# Patient Record
Sex: FEMALE | Race: WHITE
[De-identification: names, ages, dates, MRNs, and addresses within clinical notes are randomized per-mention and may not be internally consistent; named-entity substitution may affect disease eponyms.]

---

## 2018-04-11 ENCOUNTER — HOSPITAL ENCOUNTER (OUTPATIENT)
Dept: HOSPITAL 75 - SDC | Age: 58
End: 2018-04-11
Attending: PODIATRIST
Payer: COMMERCIAL

## 2018-04-11 VITALS — BODY MASS INDEX: 49.61 KG/M2 | HEIGHT: 63 IN | WEIGHT: 280 LBS

## 2018-04-11 VITALS — DIASTOLIC BLOOD PRESSURE: 67 MMHG | SYSTOLIC BLOOD PRESSURE: 162 MMHG

## 2018-04-11 DIAGNOSIS — Z45.2: Primary | ICD-10-CM

## 2018-04-11 PROCEDURE — 36569 INSJ PICC 5 YR+ W/O IMAGING: CPT

## 2018-04-11 PROCEDURE — 76937 US GUIDE VASCULAR ACCESS: CPT

## 2018-04-11 PROCEDURE — 71045 X-RAY EXAM CHEST 1 VIEW: CPT

## 2018-04-11 NOTE — DIAGNOSTIC IMAGING REPORT
INDICATION: PICC line placement.



TIME OF EXAM: 02:55 p.m.



COMPARISON: Comparison is made with prior exam from 03/24/2018.



FINDINGS: A left upper extremity PICC line has been placed and

has its tip in good position overlying the SVC. The heart is

enlarged. No infiltrates are seen. No effusion or pneumothorax is

identified. Scattered granulomas in both lungs are again noted.



IMPRESSION: Satisfactory PICC line placement.



Dictated by: 



  Dictated on workstation # [id number]